# Patient Record
Sex: MALE | Race: WHITE | Employment: FULL TIME | URBAN - METROPOLITAN AREA
[De-identification: names, ages, dates, MRNs, and addresses within clinical notes are randomized per-mention and may not be internally consistent; named-entity substitution may affect disease eponyms.]

---

## 2017-06-30 ENCOUNTER — HOSPITAL ENCOUNTER (EMERGENCY)
Age: 37
Discharge: HOME | End: 2017-07-01
Attending: EMERGENCY MEDICINE
Payer: SELF-PAY

## 2017-06-30 DIAGNOSIS — T40.1X1A HEROIN OVERDOSE, ACCIDENTAL OR UNINTENTIONAL, INITIAL ENCOUNTER (HCC): Primary | ICD-10-CM

## 2017-06-30 DIAGNOSIS — Z72.0 TOBACCO ABUSE: ICD-10-CM

## 2017-06-30 LAB
ANION GAP BLD CALC-SCNC: 19 MMOL/L (ref 3–18)
APAP SERPL-MCNC: <2 UG/ML (ref 10–30)
ARTERIAL PATENCY WRIST A: ABNORMAL
BASE DEFICIT BLD-SCNC: 12 MMOL/L
BASOPHILS # BLD AUTO: 0 K/UL (ref 0–0.06)
BASOPHILS # BLD: 0 % (ref 0–2)
BDY SITE: ABNORMAL
BODY TEMPERATURE: 98.5
BUN SERPL-MCNC: 14 MG/DL (ref 7–18)
BUN/CREAT SERPL: 7 (ref 12–20)
CALCIUM SERPL-MCNC: 9.3 MG/DL (ref 8.5–10.1)
CHLORIDE SERPL-SCNC: 103 MMOL/L (ref 100–108)
CK MB CFR SERPL CALC: NORMAL % (ref 0–4)
CK MB SERPL-MCNC: <1 NG/ML (ref 5–25)
CK SERPL-CCNC: 136 U/L (ref 39–308)
CO2 SERPL-SCNC: 20 MMOL/L (ref 21–32)
CREAT SERPL-MCNC: 1.92 MG/DL (ref 0.6–1.3)
DIFFERENTIAL METHOD BLD: ABNORMAL
EOSINOPHIL # BLD: 0 K/UL (ref 0–0.4)
EOSINOPHIL NFR BLD: 0 % (ref 0–5)
ERYTHROCYTE [DISTWIDTH] IN BLOOD BY AUTOMATED COUNT: 12.7 % (ref 11.6–14.5)
ETHANOL SERPL-MCNC: <3 MG/DL (ref 0–3)
GAS FLOW.O2 O2 DELIVERY SYS: ABNORMAL L/MIN
GAS FLOW.O2 SETTING OXYMISER: 15 L/M
GLUCOSE SERPL-MCNC: 310 MG/DL (ref 74–99)
HCO3 BLD-SCNC: 16.8 MMOL/L (ref 22–26)
HCT VFR BLD AUTO: 44.1 % (ref 36–48)
HGB BLD-MCNC: 14.6 G/DL (ref 13–16)
LYMPHOCYTES # BLD AUTO: 29 % (ref 21–52)
LYMPHOCYTES # BLD: 4.7 K/UL (ref 0.9–3.6)
MAGNESIUM SERPL-MCNC: 2.5 MG/DL (ref 1.6–2.6)
MCH RBC QN AUTO: 31.9 PG (ref 24–34)
MCHC RBC AUTO-ENTMCNC: 33.1 G/DL (ref 31–37)
MCV RBC AUTO: 96.3 FL (ref 74–97)
MONOCYTES # BLD: 0.8 K/UL (ref 0.05–1.2)
MONOCYTES NFR BLD AUTO: 5 % (ref 3–10)
NEUTS SEG # BLD: 10.6 K/UL (ref 1.8–8)
NEUTS SEG NFR BLD AUTO: 66 % (ref 40–73)
O2/TOTAL GAS SETTING VFR VENT: 1 %
PCO2 BLD: 44.8 MMHG (ref 35–45)
PH BLD: 7.18 [PH] (ref 7.35–7.45)
PLATELET # BLD AUTO: 436 K/UL (ref 135–420)
PMV BLD AUTO: 9.9 FL (ref 9.2–11.8)
PO2 BLD: 298 MMHG (ref 80–100)
POTASSIUM SERPL-SCNC: 3.9 MMOL/L (ref 3.5–5.5)
RBC # BLD AUTO: 4.58 M/UL (ref 4.7–5.5)
SALICYLATES SERPL-MCNC: 4.1 MG/DL (ref 2.8–20)
SAO2 % BLD: 100 % (ref 92–97)
SERVICE CMNT-IMP: ABNORMAL
SODIUM SERPL-SCNC: 142 MMOL/L (ref 136–145)
SPECIMEN TYPE: ABNORMAL
TOTAL RESP. RATE, ITRR: 20
TROPONIN I SERPL-MCNC: <0.02 NG/ML (ref 0–0.06)
WBC # BLD AUTO: 16.1 K/UL (ref 4.6–13.2)

## 2017-06-30 PROCEDURE — 83735 ASSAY OF MAGNESIUM: CPT | Performed by: EMERGENCY MEDICINE

## 2017-06-30 PROCEDURE — 80307 DRUG TEST PRSMV CHEM ANLYZR: CPT | Performed by: EMERGENCY MEDICINE

## 2017-06-30 PROCEDURE — 74011250636 HC RX REV CODE- 250/636: Performed by: EMERGENCY MEDICINE

## 2017-06-30 PROCEDURE — 82803 BLOOD GASES ANY COMBINATION: CPT

## 2017-06-30 PROCEDURE — 85025 COMPLETE CBC W/AUTO DIFF WBC: CPT | Performed by: EMERGENCY MEDICINE

## 2017-06-30 PROCEDURE — 99285 EMERGENCY DEPT VISIT HI MDM: CPT

## 2017-06-30 PROCEDURE — 82550 ASSAY OF CK (CPK): CPT | Performed by: EMERGENCY MEDICINE

## 2017-06-30 PROCEDURE — 96374 THER/PROPH/DIAG INJ IV PUSH: CPT

## 2017-06-30 PROCEDURE — 80048 BASIC METABOLIC PNL TOTAL CA: CPT | Performed by: EMERGENCY MEDICINE

## 2017-06-30 PROCEDURE — 96361 HYDRATE IV INFUSION ADD-ON: CPT

## 2017-06-30 PROCEDURE — 36600 WITHDRAWAL OF ARTERIAL BLOOD: CPT

## 2017-06-30 RX ORDER — NALOXONE HYDROCHLORIDE 0.4 MG/ML
2 INJECTION, SOLUTION INTRAMUSCULAR; INTRAVENOUS; SUBCUTANEOUS
Status: COMPLETED | OUTPATIENT
Start: 2017-06-30 | End: 2017-06-30

## 2017-06-30 RX ORDER — SODIUM CHLORIDE 0.9 % (FLUSH) 0.9 %
5-10 SYRINGE (ML) INJECTION EVERY 8 HOURS
Status: DISCONTINUED | OUTPATIENT
Start: 2017-06-30 | End: 2017-07-01 | Stop reason: HOSPADM

## 2017-06-30 RX ORDER — SODIUM CHLORIDE 0.9 % (FLUSH) 0.9 %
5-10 SYRINGE (ML) INJECTION AS NEEDED
Status: DISCONTINUED | OUTPATIENT
Start: 2017-06-30 | End: 2017-07-01 | Stop reason: HOSPADM

## 2017-06-30 RX ORDER — NALOXONE HYDROCHLORIDE 1 MG/ML
4 INJECTION INTRAMUSCULAR; INTRAVENOUS; SUBCUTANEOUS
Status: COMPLETED | OUTPATIENT
Start: 2017-06-30 | End: 2017-06-30

## 2017-06-30 RX ADMIN — NALOXONE HYDROCHLORIDE 4 MG: 1 INJECTION PARENTERAL at 22:44

## 2017-06-30 RX ADMIN — NALOXONE HYDROCHLORIDE 2 MG: 0.4 INJECTION, SOLUTION INTRAMUSCULAR; INTRAVENOUS; SUBCUTANEOUS at 22:40

## 2017-06-30 RX ADMIN — SODIUM CHLORIDE 1000 ML: 900 INJECTION, SOLUTION INTRAVENOUS at 23:06

## 2017-07-01 VITALS
BODY MASS INDEX: 31.1 KG/M2 | HEIGHT: 69 IN | TEMPERATURE: 98.5 F | OXYGEN SATURATION: 100 % | HEART RATE: 86 BPM | SYSTOLIC BLOOD PRESSURE: 136 MMHG | DIASTOLIC BLOOD PRESSURE: 71 MMHG | RESPIRATION RATE: 16 BRPM | WEIGHT: 210 LBS

## 2017-07-01 NOTE — ED NOTES
People who brought pt to ED are no longer in parking lot nor lobby. ED registration states that male and female left immediately after pt brought into ED.

## 2017-07-01 NOTE — ED PROVIDER NOTES
HPI Comments: 10:34 PM    Clark Scanlon is a 39 y.o. male with no pertinent PMHx, presenting via friends in car to the ED due to unresponsiveness x an unknown time. Pt's friends state that they are unsure if the pt took any illicit drugs or used any alcohol tonight. Pt was assessed by this physician in the front passenger seat of a friend's vehicle, unresponsive to voice or touch. Pt was given 2mg of narcan intranasal, while still in the vehicle and 4 mg IV when in the room. Pt became alert after initial exam and admits to cocaine and heroin use, which he injects to his genital area. PCP: None    Hx is limited due to the pt's condition. The history is provided by the patient. No  was used. Past Medical History:   Diagnosis Date    Knee problem        Past Surgical History:   Procedure Laterality Date    ABDOMEN SURGERY PROC UNLISTED      HX ORTHOPAEDIC  hand surgery    INTESTINE SURG PROCEDURE UNLISTED           History reviewed. No pertinent family history. Social History     Social History    Marital status:      Spouse name: N/A    Number of children: N/A    Years of education: N/A     Occupational History    Not on file. Social History Main Topics    Smoking status: Current Every Day Smoker     Packs/day: 0.50     Years: 15.00    Smokeless tobacco: Never Used    Alcohol use No    Drug use: Yes     Special: Heroin, Cocaine    Sexual activity: Not on file     Other Topics Concern    Not on file     Social History Narrative         ALLERGIES: Review of patient's allergies indicates no known allergies.     Review of Systems   Unable to perform ROS: Patient unresponsive       Vitals:    06/30/17 2300 06/30/17 2306 06/30/17 2330 07/01/17 0100   BP: 122/77 122/77 122/63 136/71   Pulse: 100 100 (!) 104 86   Resp: 26 26 25 16   Temp:  98.5 °F (36.9 °C)     SpO2: 100% 100% 100% 100%   Weight:  95.3 kg (210 lb)     Height:  5' 9\" (1.753 m)              Physical Exam   Constitutional: He appears well-developed and well-nourished. Mottled skin   HENT:   Head: Normocephalic. Right Ear: External ear normal.   Left Ear: External ear normal.   Nose: Nose normal.   Mouth/Throat: Oropharynx is clear and moist.   Dry pasty secretions from mouth   Eyes:   Pupils are pinpoint   Cardiovascular: Normal rate, regular rhythm, normal heart sounds and intact distal pulses. Pulmonary/Chest:   No spontaneous respirations   Abdominal: Soft. Absent bowel sounds   Genitourinary:   Genitourinary Comments: Tourniquet found at the base of the penile ayush, with injection site noted along the dorsal vein   Musculoskeletal:   Faint pulse   Skin: He is diaphoretic. As above   Nursing note and vitals reviewed. RESULTS:    CARDIAC MONITOR NOTE:  Cardiac Rhythm: sinus tachycardia  Rate: 102 bpm     PULSE OXIMETRY NOTE:  Pulse-ox is 100% on RA  Interpretation: NML       No orders to display        Labs Reviewed   CBC WITH AUTOMATED DIFF - Abnormal; Notable for the following:        Result Value    WBC 16.1 (*)     RBC 4.58 (*)     PLATELET 258 (*)     ABS. NEUTROPHILS 10.6 (*)     ABS.  LYMPHOCYTES 4.7 (*)     All other components within normal limits   METABOLIC PANEL, BASIC - Abnormal; Notable for the following:     CO2 20 (*)     Anion gap 19 (*)     Glucose 310 (*)     Creatinine 1.92 (*)     BUN/Creatinine ratio 7 (*)     GFR est AA 48 (*)     GFR est non-AA 40 (*)     All other components within normal limits   ACETAMINOPHEN - Abnormal; Notable for the following:     Acetaminophen level <2 (*)     All other components within normal limits   POC G3 - Abnormal; Notable for the following:     pH (POC) 7.181 (*)     pO2 (POC) 298 (*)     HCO3 (POC) 16.8 (*)     sO2 (POC) 100 (*)     All other components within normal limits   SALICYLATE   MAGNESIUM   CARDIAC PANEL,(CK, CKMB & TROPONIN)   ETHYL ALCOHOL   DRUG SCREEN, URINE   BLOOD GAS, ARTERIAL       Recent Results (from the past 12 hour(s))   SALICYLATE    Collection Time: 06/30/17 10:50 PM   Result Value Ref Range    SALICYLATE 4.1 2.8 - 20 MG/DL   CBC WITH AUTOMATED DIFF    Collection Time: 06/30/17 10:50 PM   Result Value Ref Range    WBC 16.1 (H) 4.6 - 13.2 K/uL    RBC 4.58 (L) 4.70 - 5.50 M/uL    HGB 14.6 13.0 - 16.0 g/dL    HCT 44.1 36.0 - 48.0 %    MCV 96.3 74.0 - 97.0 FL    MCH 31.9 24.0 - 34.0 PG    MCHC 33.1 31.0 - 37.0 g/dL    RDW 12.7 11.6 - 14.5 %    PLATELET 328 (H) 140 - 420 K/uL    MPV 9.9 9.2 - 11.8 FL    NEUTROPHILS 66 40 - 73 %    LYMPHOCYTES 29 21 - 52 %    MONOCYTES 5 3 - 10 %    EOSINOPHILS 0 0 - 5 %    BASOPHILS 0 0 - 2 %    ABS. NEUTROPHILS 10.6 (H) 1.8 - 8.0 K/UL    ABS. LYMPHOCYTES 4.7 (H) 0.9 - 3.6 K/UL    ABS. MONOCYTES 0.8 0.05 - 1.2 K/UL    ABS. EOSINOPHILS 0.0 0.0 - 0.4 K/UL    ABS.  BASOPHILS 0.0 0.0 - 0.06 K/UL    DF AUTOMATED     METABOLIC PANEL, BASIC    Collection Time: 06/30/17 10:50 PM   Result Value Ref Range    Sodium 142 136 - 145 mmol/L    Potassium 3.9 3.5 - 5.5 mmol/L    Chloride 103 100 - 108 mmol/L    CO2 20 (L) 21 - 32 mmol/L    Anion gap 19 (H) 3.0 - 18 mmol/L    Glucose 310 (H) 74 - 99 mg/dL    BUN 14 7.0 - 18 MG/DL    Creatinine 1.92 (H) 0.6 - 1.3 MG/DL    BUN/Creatinine ratio 7 (L) 12 - 20      GFR est AA 48 (L) >60 ml/min/1.73m2    GFR est non-AA 40 (L) >60 ml/min/1.73m2    Calcium 9.3 8.5 - 10.1 MG/DL   MAGNESIUM    Collection Time: 06/30/17 10:50 PM   Result Value Ref Range    Magnesium 2.5 1.6 - 2.6 mg/dL   CARDIAC PANEL,(CK, CKMB & TROPONIN)    Collection Time: 06/30/17 10:50 PM   Result Value Ref Range     39 - 308 U/L    CK - MB <1.0 <3.6 ng/ml    CK-MB Index  0.0 - 4.0 %     CALCULATION NOT PERFORMED WHEN RESULT IS BELOW LINEAR LIMIT    Troponin-I, Qt. <0.02 0.00 - 0.06 NG/ML   ACETAMINOPHEN    Collection Time: 06/30/17 10:50 PM   Result Value Ref Range    Acetaminophen level <2 (L) 10 - 30 ug/mL   ETHYL ALCOHOL    Collection Time: 06/30/17 10:50 PM   Result Value Ref Range ALCOHOL(ETHYL),SERUM <3 0 - 3 MG/DL   POC G3    Collection Time: 06/30/17 10:52 PM   Result Value Ref Range    Device: Non rebreather      Flow rate (POC) 15 L/M    FIO2 (POC) 1.0 %    pH (POC) 7.181 (LL) 7.35 - 7.45      pCO2 (POC) 44.8 35.0 - 45.0 MMHG    pO2 (POC) 298 (H) 80 - 100 MMHG    HCO3 (POC) 16.8 (L) 22 - 26 MMOL/L    sO2 (POC) 100 (H) 92 - 97 %    Base deficit (POC) 12 mmol/L    Allens test (POC) N/A      Total resp. rate 20      Site RIGHT RADIAL      Patient temp. 98.5      Specimen type (POC) ARTERIAL      Performed by Ashutosh López          MDM  Number of Diagnoses or Management Options  Heroin overdose, accidental or unintentional, initial encounter:   Tobacco abuse:   Diagnosis management comments: Respiratory arrest with pin pupils, immediately tx for suspected narcotic or heroin overdose. Other possibilities include aspiration. Possible, but unlikely, cardiac event or other pulmonary foreign body. After intranasal narcan delivered in the parking lot, quickly taken to ED bed 3. Given pulmonary support (BVM) and narcan 4 mg IV, with near resolution of his respiratory distress. Further workup for congestion, lab work, blood gases and possibility of cardiac injury or neurologic ischemia/anoxic brain injury.        Amount and/or Complexity of Data Reviewed  Clinical lab tests: ordered and reviewed  Review and summarize past medical records: yes    Critical Care  Total time providing critical care: 30-74 minutes    ED Course     Medications   sodium chloride (NS) flush 5-10 mL (not administered)   sodium chloride (NS) flush 5-10 mL (not administered)   sodium chloride (NS) flush 5-10 mL (not administered)   sodium chloride (NS) flush 5-10 mL (not administered)   naloxone (NARCAN) injection 2 mg (2 mg IntraNASal Given 6/30/17 2240)   naloxone (NARCAN) injection 4 mg (4 mg IntraVENous Given 6/30/17 2244)   sodium chloride 0.9 % bolus infusion 1,000 mL (0 mL IntraVENous IV Completed 7/1/17 0100) Procedures    PROGRESS NOTE:  10:34 PM  Initial assessment performed. Written by Geovanni Hudson, MIGUEL Scribe, as dictated by Hannah Daugherty MD.    PROGRESS NOTE:  11:00 PM  Pt re-evaluated. Pt is now more alert, responsive to questions, pupils are now 5mm with normal spontaneous respirations. He becomes somnolent again without persistent stimulation, but patent airway. Written by MIGUEL Brannone, as dictated by Hannah Daugherty MD.     PROGRESS NOTE:  1:58 AM  Pt and/or family have been updated on their results. Pt and/or pt's family are aware of the plan of care and are in agreement. Written by MIGUEL Brannon, as dictated by Hannah Daugherty MD.     DISCHARGE NOTE:  2:36 AM  The patient is ready for discharge. The patient's signs, symptoms, diagnosis, and discharge instructions have been discussed and the patient and/or family has conveyed their understanding. The patient and/or family is to follow up as recommended or return to the ER should their symptoms worsen. Plan has been discussed and the patient and/or family is in agreement. Written by MIGUEL Brannon, as dictated by Hannah Daugherty MD.      CLINICAL IMPRESSION:  1. Heroin overdose, accidental or unintentional, initial encounter    2. Tobacco abuse        PLAN:  1. D/C Home    2. There are no discharge medications for this patient. 3.   Follow-up Information     Follow up With Details Comments [address] ([facility]) Schedule an appointment as soon as possible for a visit for behavioral health follow up [address] [place] [address] EMERGENCY DEPT  As needed, If symptoms worsen [address] [facility]  [phone]          SCRIBE ATTESTATION:  This note is prepared by Geovanni Hudson, acting as Scribe for [employer]. Roberto Daugherty MD.    PROVIDER ATTESTATION:  [employer].  Roberto Daugherty MD: The scribe's documentation has been prepared under my direction and personally reviewed by me in its entirety. I confirm that the note above accurately reflects all work, treatment, procedures, and medical decision making performed by me. CRITICAL CARE NOTE:  6:16 AM  I have spent 30 minutes of critical care time involved in lab review, consultations with specialist, family decision-making, and documentation. During this entire length of time I was immediately available to the patient. Critical Care: The reason for providing this level of medical care for this critically ill patient was due a critical illness that impaired one or more vital organ systems such that there was a high probability of imminent or life threatening deterioration in the patients condition. This care involved high complexity decision making to assess, manipulate, and support vital system functions, to treat this degreee vital organ system failure and to prevent further life threatening deterioration of the patients condition.

## 2017-07-01 NOTE — ED NOTES
Pt alert, asking \"where are my pants? \", informed patient that he was brought to ED with the shorts he had on at this time, the t shirt we cut and shoes. Pt requested phone to call his brother to bring him clothes from home. Pt states he had been doing drugs all evening after getting off from work. Cup of water provided for patient.

## 2017-07-01 NOTE — DISCHARGE INSTRUCTIONS
Opioid Overdose: Care Instructions  Your Care Instructions    You have had treatment to help your body recover from taking too much of an opioid. You are getting better, but you may not feel well for a while. It takes time for the opioids to leave your body. How long it takes to feel better depends on which drug you took and how much you took of it. Opioids include illegal drugs such as heroin, often called smack, junk, H, and ska. Opioids also include medicines that doctors prescribe to treat pain. These are medicines such as oxycodone, methadone, and buprenorphine. They are sometimes sold and used illegally. Taking too much of an opioid can be dangerous. It may cause:  · Trouble breathing. · Low blood pressure. · A low heart rate. · A coma. When the doctor treated you for the overdose, he or she may have:  · Watched your symptoms or done tests to find out what kind of drug you took. · Given you fluids. · Given you oxygen to help you breathe. · Given you a medicine called naloxone to help reverse the effects of the opioid. · Done several tests, including blood tests, to see how you're responding to treatment. The doctor also watched you carefully to make sure you were recovering safely. Follow-up care is a key part of your treatment and safety. Be sure to make and go to all appointments, and call your doctor if you are having problems. It's also a good idea to know your test results and keep a list of the medicines you take. How can you care for yourself at home? · If you take opioids regularly, your body gets used to them. This is called dependency. If you are dependent on this drug, you may have withdrawal symptoms when you stop taking it. These can include nausea, sweating, chills, diarrhea, stomach cramps, and muscle aches. Withdrawal can last up to several weeks, depending on which drug you took. You may feel very ill, but you are probably not in medical danger.   · Your doctor may give you medicine to help you feel better. To help get through withdrawal, you can also:  ¨ Get plenty of rest.  ¨ Drink plenty of fluids. ¨ Stay active, but don't tire yourself. ¨ Eat a healthy diet. · If you had a tube in your throat to help you breathe, you may have a sore throat or hoarseness that can last a few days. Sip liquids to help soothe your throat. · Do not drink alcohol or take illegal drugs. · Do not drive if you feel sleepy or groggy while you recover from your overdose. · Get help to stop using drugs. Talk to your doctor about drug treatment programs. · Talk to your doctor or pharmacist about having a naloxone rescue kit on hand. When should you call for help? Call 911 anytime you think you may need emergency care. For example, call if:  · You feel you cannot stop from hurting yourself or someone else. Call your doctor now or seek immediate medical care if:  · You have new or worse withdrawal symptoms, such as:  ¨ Stomach cramps. ¨ Vomiting. ¨ Diarrhea. ¨ Muscle aches. ¨ Sweating. Watch closely for changes in your health, and be sure to contact your doctor if:  · You do not get better as expected. Where can you learn more? Go to http://edi-desean.info/. Enter 737 96 181 in the search box to learn more about \"Opioid Overdose: Care Instructions. \"  Current as of: February 21, 2017  Content Version: 11.3  © 9258-7623 [employer]. Care instructions adapted under license by [employer] (which disclaims liability or warranty for this information). If you have questions about a medical condition or this instruction, always ask your healthcare professional. [place] [address] any warranty or liability for your use of this information.

## 2017-07-01 NOTE — ED NOTES
I have reviewed discharge instructions with the patient. The patient verbalized understanding. Patient armband removed and shredded  Reviewed and verified discharge instructions with patient, no prescriptions given, pt discharged ambulatory on steady gait with family.

## 2017-07-01 NOTE — ED NOTES
Pt admits to [facility] officer that he shot ou heroin and cocaine, denies knowing where or who he was with with this occurred. Later, pt states that it occurred in [place].

## 2017-07-01 NOTE — ED TRIAGE NOTES
Called to front to help get \"unresponsive\" patient out of car. Found small SUV parked at curb with male and female standing outside of vehicle stating that they had \"picked up\" their friend to give him a ride home. Stated that pt \"did not sound right\" when they went to pick him up and found him lying on the ground at location where he was picked up. Brought him to ED because \"we didn't want to see him die\". Both report that they do not know male in car well and do not know if he is a drug user. Pt found slumped over in passenger seat of vehicle with blue face and agonal respirations, carotid pulse strong. Pt lied back, airway repositioned, and another RN went back into ED to obtain stretcher and Narcan. MD and ED medic to driveway to assist.  Pt given 2mg IN Narcan without response and removed from vehicle and placed onto stretcher by this RN and Dr Aide Maldonado, brought immediately to ED room #3. Sepsis Screening completed    (  )Patient meets SIRS criteria. (X  )Patient does not meet SIRS criteria.       SIRS Criteria is achieved when two or more of the following are present   Temperature < 96.8°F (36°C) or > 100.9°F (38.3°C)   Heart Rate > 90 beats per minute   Respiratory Rate > 20 breaths per minute   WBC count > 12,000 or <4,000 or > 10% bands

## 2017-07-01 NOTE — ED NOTES
After Narcan given, pt became diaphoretic and skin became pink again, pt has multiple old puncture [facility]. Pt with shorts on and hole in the front of the crotch area, shorts lowered and found home made tourniquet around the scrotum. Attempted to remove tourniquet and pt immediately sat up and grabbed at scrotum, pt awake enough to remove tourniquet unassisted.

## 2019-06-10 ENCOUNTER — HOSPITAL ENCOUNTER (EMERGENCY)
Age: 39
Discharge: HOME | End: 2019-06-10
Attending: EMERGENCY MEDICINE
Payer: SELF-PAY

## 2019-06-10 VITALS
DIASTOLIC BLOOD PRESSURE: 74 MMHG | WEIGHT: 197 LBS | HEIGHT: 69 IN | RESPIRATION RATE: 20 BRPM | SYSTOLIC BLOOD PRESSURE: 117 MMHG | TEMPERATURE: 98.4 F | HEART RATE: 73 BPM | BODY MASS INDEX: 29.18 KG/M2 | OXYGEN SATURATION: 100 %

## 2019-06-10 DIAGNOSIS — L02.419 ABSCESS OF ARM: Primary | ICD-10-CM

## 2019-06-10 PROCEDURE — 99282 EMERGENCY DEPT VISIT SF MDM: CPT

## 2019-06-10 RX ORDER — DEXTROAMPHETAMINE SACCHARATE, AMPHETAMINE ASPARTATE, DEXTROAMPHETAMINE SULFATE AND AMPHETAMINE SULFATE 2.5; 2.5; 2.5; 2.5 MG/1; MG/1; MG/1; MG/1
10 TABLET ORAL
COMMUNITY

## 2019-06-10 RX ORDER — CEPHALEXIN 500 MG/1
500 CAPSULE ORAL 4 TIMES DAILY
Qty: 28 CAP | Refills: 0 | Status: SHIPPED | OUTPATIENT
Start: 2019-06-10 | End: 2019-06-17

## 2019-06-10 RX ORDER — SULFAMETHOXAZOLE AND TRIMETHOPRIM 800; 160 MG/1; MG/1
1 TABLET ORAL 2 TIMES DAILY
Qty: 20 TAB | Refills: 0 | Status: SHIPPED | OUTPATIENT
Start: 2019-06-10 | End: 2019-06-20

## 2019-06-10 NOTE — DISCHARGE INSTRUCTIONS

## 2019-06-10 NOTE — ED PROVIDER NOTES
EMERGENCY DEPARTMENT HISTORY AND PHYSICAL EXAM    Date: 6/10/2019  Patient Name: Carson Adames    History of Presenting Illness     Chief Complaint   Patient presents with    Skin Problem         History Provided By: Patient    12:17 PM  Carson Adames is a 45 y.o. male presents to the emergency department C/O right forearm pain onset this morning. Associated sxs include redness and swelling. Patient states he had some itching to the right forearm last night, woke up this morning with pain redness and swelling to the right forearm. Unsure of any insect bite. Pt denies fever, injury, history of abscesses, new tattoo, IV drug use and any other sxs or complaints. PCP: None    Current Outpatient Medications   Medication Sig Dispense Refill    dextroamphetamine-amphetamine (ADDERALL) 10 mg tablet Take 10 mg by mouth.  cephALEXin (KEFLEX) 500 mg capsule Take 1 Cap by mouth four (4) times daily for 7 days. 28 Cap 0    trimethoprim-sulfamethoxazole (BACTRIM DS) 160-800 mg per tablet Take 1 Tab by mouth two (2) times a day for 10 days. 20 Tab 0       Past History     Past Medical History:  Past Medical History:   Diagnosis Date    Knee problem        Past Surgical History:  Past Surgical History:   Procedure Laterality Date    ABDOMEN SURGERY PROC UNLISTED      HX ORTHOPAEDIC  hand surgery    INTESTINE SURG PROCEDURE UNLISTED         Family History:  History reviewed. No pertinent family history. Social History:  Social History     Tobacco Use    Smoking status: Current Every Day Smoker     Packs/day: 0.50     Years: 15.00     Pack years: 7.50    Smokeless tobacco: Never Used   Substance Use Topics    Alcohol use: No    Drug use: Yes     Types: Heroin, Cocaine       Allergies:  No Known Allergies      Review of Systems   Review of Systems   Constitutional: Negative for fever. Skin: Positive for rash (right forearm). All other systems reviewed and are negative.         Physical Exam     Vitals: 06/10/19 1127   BP: 117/74   Pulse: 73   Resp: 20   Temp: 98.4 °F (36.9 °C)   SpO2: 100%   Weight: 89.4 kg (197 lb)   Height: 5' 9\" (1.753 m)     Physical Exam    Vital signs and nursing notes reviewed. CONSTITUTIONAL: Alert. Well-appearing; well-nourished; in no apparent distress. EXT: Normal ROM in all four extremities. Right upper extremity: Approximately 3 cm round area of raised mildly tender erythema to the ulnar aspect of his right mid forearm; no fluctuance or streaking or open wounds; old tattoos on arm. Full range of motion and nontender elbow and wrist.  Distal sensation intact. 2+ radial pulse  NEURO: A & O x3. PSYCH:  Mood and affect appropriate. Diagnostic Study Results     Labs -   No results found for this or any previous visit (from the past 12 hour(s)). Radiologic Studies -   No orders to display     CT Results  (Last 48 hours)    None        CXR Results  (Last 48 hours)    None          Medications given in the ED-  Medications - No data to display      Medical Decision Making   I am the first provider for this patient. I reviewed the vital signs, available nursing notes, past medical history, past surgical history, family history and social history. Records Reviewed: Nursing Notes      Procedures:  Procedures    ED Course:  12:17 PM   Initial assessment performed. The patients presenting problems have been discussed, and they are in agreement with the care plan formulated and outlined with them. I have encouraged them to ask questions as they arise throughout their visit. Provider Notes (Medical Decision Making): Zane Smalls is a 45 y.o. male with new early abscess to right forearm. He is afebrile, without any fluctuance, skin breakdown or evidence of joint involvement. No history of IV drug use and visible evidence of this. Abscess is not ready for I&D. Will place empirically on antibiotics, recommend warm compresses and 48-hour wound recheck.   Advised to return sooner if symptoms worsen. Diagnosis and Disposition       DISCHARGE NOTE:    Garcia Allen's  results have been reviewed with him. He has been counseled regarding his diagnosis, treatment, and plan. He verbally conveys understanding and agreement of the signs, symptoms, diagnosis, treatment and prognosis and additionally agrees to follow up as discussed. He also agrees with the care-plan and conveys that all of his questions have been answered. I have also provided discharge instructions for him that include: educational information regarding their diagnosis and treatment, and list of reasons why they would want to return to the ED prior to their follow-up appointment, should his condition change. He has been provided with education for proper emergency department utilization. CLINICAL IMPRESSION:    1. Abscess of arm        PLAN:  1. D/C Home  2. Discharge Medication List as of 6/10/2019 12:15 PM      START taking these medications    Details   cephALEXin (KEFLEX) 500 mg capsule Take 1 Cap by mouth four (4) times daily for 7 days. , Normal, Disp-28 Cap, R-0      trimethoprim-sulfamethoxazole (BACTRIM DS) 160-800 mg per tablet Take 1 Tab by mouth two (2) times a day for 10 days. , Normal, Disp-20 Tab, R-0         CONTINUE these medications which have NOT CHANGED    Details   dextroamphetamine-amphetamine (ADDERALL) 10 mg tablet Take 10 mg by mouth., Historical Med           3. Follow-up Information     Follow up With Specialties Details Why [address]    [facility] Medicine In 2 days For wound re-check, If symptoms worsen [address] [address]  [phone]        _______________________________      Please note that this dictation was completed with [employer], the computer voice recognition software. Quite often unanticipated grammatical, syntax, homophones, and other interpretive errors are inadvertently transcribed by the computer software.   Please disregard these errors. Please excuse any errors that have escaped final proofreading.

## 2020-01-15 ENCOUNTER — HOSPITAL ENCOUNTER (EMERGENCY)
Age: 40
Discharge: HOME | End: 2020-01-15
Attending: EMERGENCY MEDICINE
Payer: SELF-PAY

## 2020-01-15 VITALS
DIASTOLIC BLOOD PRESSURE: 74 MMHG | OXYGEN SATURATION: 100 % | HEIGHT: 69 IN | SYSTOLIC BLOOD PRESSURE: 120 MMHG | WEIGHT: 210 LBS | RESPIRATION RATE: 26 BRPM | BODY MASS INDEX: 31.1 KG/M2 | HEART RATE: 72 BPM | TEMPERATURE: 97.5 F

## 2020-01-15 DIAGNOSIS — F19.10 POLYSUBSTANCE ABUSE (HCC): ICD-10-CM

## 2020-01-15 DIAGNOSIS — T50.901A DRUG OVERDOSE, ACCIDENTAL OR UNINTENTIONAL, INITIAL ENCOUNTER: Primary | ICD-10-CM

## 2020-01-15 LAB
ALBUMIN SERPL-MCNC: 5 G/DL (ref 3.4–5)
ALBUMIN/GLOB SERPL: 1.2 {RATIO} (ref 0.8–1.7)
ALP SERPL-CCNC: 63 U/L (ref 45–117)
ALT SERPL-CCNC: 108 U/L (ref 16–61)
AMPHET UR QL SCN: POSITIVE
ANION GAP SERPL CALC-SCNC: 11 MMOL/L (ref 3–18)
APAP SERPL-MCNC: <2 UG/ML (ref 10–30)
APPEARANCE UR: CLEAR
AST SERPL-CCNC: 59 U/L (ref 10–38)
ATRIAL RATE: 77 BPM
BACTERIA URNS QL MICRO: ABNORMAL /HPF
BARBITURATES UR QL SCN: NEGATIVE
BASOPHILS # BLD: 0 K/UL (ref 0–0.1)
BASOPHILS NFR BLD: 0 % (ref 0–2)
BENZODIAZ UR QL: NEGATIVE
BILIRUB SERPL-MCNC: 0.5 MG/DL (ref 0.2–1)
BILIRUB UR QL: NEGATIVE
BUN SERPL-MCNC: 15 MG/DL (ref 7–18)
BUN/CREAT SERPL: 11 (ref 12–20)
CALCIUM SERPL-MCNC: 9.2 MG/DL (ref 8.5–10.1)
CALCULATED P AXIS, ECG09: 71 DEGREES
CALCULATED R AXIS, ECG10: 74 DEGREES
CALCULATED T AXIS, ECG11: 68 DEGREES
CANNABINOIDS UR QL SCN: NEGATIVE
CHLORIDE SERPL-SCNC: 102 MMOL/L (ref 100–111)
CO2 SERPL-SCNC: 27 MMOL/L (ref 21–32)
COCAINE UR QL SCN: POSITIVE
COLOR UR: YELLOW
CREAT SERPL-MCNC: 1.36 MG/DL (ref 0.6–1.3)
DIAGNOSIS, 93000: NORMAL
DIFFERENTIAL METHOD BLD: NORMAL
EOSINOPHIL # BLD: 0 K/UL (ref 0–0.4)
EOSINOPHIL NFR BLD: 0 % (ref 0–5)
EPITH CASTS URNS QL MICRO: ABNORMAL /LPF (ref 0–5)
ERYTHROCYTE [DISTWIDTH] IN BLOOD BY AUTOMATED COUNT: 12.2 % (ref 11.6–14.5)
ETHANOL SERPL-MCNC: <3 MG/DL (ref 0–3)
GLOBULIN SER CALC-MCNC: 4.2 G/DL (ref 2–4)
GLUCOSE SERPL-MCNC: 238 MG/DL (ref 74–99)
GLUCOSE UR STRIP.AUTO-MCNC: 250 MG/DL
HCT VFR BLD AUTO: 47.2 % (ref 36–48)
HDSCOM,HDSCOM: ABNORMAL
HGB BLD-MCNC: 15.2 G/DL (ref 13–16)
HGB UR QL STRIP: NEGATIVE
HYALINE CASTS URNS QL MICRO: ABNORMAL /LPF (ref 0–2)
KETONES UR QL STRIP.AUTO: NEGATIVE MG/DL
LEUKOCYTE ESTERASE UR QL STRIP.AUTO: NEGATIVE
LYMPHOCYTES # BLD: 2.4 K/UL (ref 0.9–3.6)
LYMPHOCYTES NFR BLD: 26 % (ref 21–52)
MAGNESIUM SERPL-MCNC: 2.5 MG/DL (ref 1.6–2.6)
MCH RBC QN AUTO: 30.5 PG (ref 24–34)
MCHC RBC AUTO-ENTMCNC: 32.2 G/DL (ref 31–37)
MCV RBC AUTO: 94.6 FL (ref 74–97)
METHADONE UR QL: NEGATIVE
MONOCYTES # BLD: 0.5 K/UL (ref 0.05–1.2)
MONOCYTES NFR BLD: 5 % (ref 3–10)
MUCOUS THREADS URNS QL MICRO: ABNORMAL /LPF
NEUTS SEG # BLD: 6.3 K/UL (ref 1.8–8)
NEUTS SEG NFR BLD: 69 % (ref 40–73)
NITRITE UR QL STRIP.AUTO: NEGATIVE
OPIATES UR QL: POSITIVE
P-R INTERVAL, ECG05: 152 MS
PCP UR QL: NEGATIVE
PH UR STRIP: 5.5 [PH] (ref 5–8)
PLATELET # BLD AUTO: 281 K/UL (ref 135–420)
PMV BLD AUTO: 9.9 FL (ref 9.2–11.8)
POTASSIUM SERPL-SCNC: 3.9 MMOL/L (ref 3.5–5.5)
PROT SERPL-MCNC: 9.2 G/DL (ref 6.4–8.2)
PROT UR STRIP-MCNC: 100 MG/DL
Q-T INTERVAL, ECG07: 424 MS
QRS DURATION, ECG06: 88 MS
QTC CALCULATION (BEZET), ECG08: 479 MS
RBC # BLD AUTO: 4.99 M/UL (ref 4.7–5.5)
RBC #/AREA URNS HPF: ABNORMAL /HPF (ref 0–5)
SALICYLATES SERPL-MCNC: 2 MG/DL (ref 2.8–20)
SODIUM SERPL-SCNC: 140 MMOL/L (ref 136–145)
SP GR UR REFRACTOMETRY: 1.02 (ref 1–1.03)
UROBILINOGEN UR QL STRIP.AUTO: 1 EU/DL (ref 0.2–1)
VENTRICULAR RATE, ECG03: 77 BPM
WBC # BLD AUTO: 9.2 K/UL (ref 4.6–13.2)
WBC URNS QL MICRO: ABNORMAL /HPF (ref 0–5)

## 2020-01-15 PROCEDURE — 80053 COMPREHEN METABOLIC PANEL: CPT

## 2020-01-15 PROCEDURE — 80307 DRUG TEST PRSMV CHEM ANLYZR: CPT

## 2020-01-15 PROCEDURE — 85025 COMPLETE CBC W/AUTO DIFF WBC: CPT

## 2020-01-15 PROCEDURE — 83735 ASSAY OF MAGNESIUM: CPT

## 2020-01-15 PROCEDURE — 93005 ELECTROCARDIOGRAM TRACING: CPT

## 2020-01-15 PROCEDURE — 81001 URINALYSIS AUTO W/SCOPE: CPT

## 2020-01-15 PROCEDURE — 99285 EMERGENCY DEPT VISIT HI MDM: CPT

## 2020-01-16 NOTE — ED NOTES
Verbal shift change report given to [address] Tenisha (oncoming nurse) by Cristobal Potter RN (offgoing nurse). Report included the following information SBAR and ED Summary.

## 2020-01-16 NOTE — DISCHARGE INSTRUCTIONS
Patient Education        Cocaine Use: Care Instructions  Overview    Using cocaine can cause physical and mental harm. It can increase your heart rate and blood pressure, which can lead to a heart attack and even death. It can raise your body temperature. You may have nausea, vomiting, and chills. If you smoke cocaine, the fumes can cause breathing problems. If you snort cocaine, it can damage your nasal passages. If you inject cocaine, it can cause an abscess at the injection site or an infection throughout your body. You may become shaky and restless. You also may see or hear things that are not there (hallucinations) or believe things that are not true (delusions). When the doctor treated you, he or she may have:  · Watched your symptoms or done tests to find out how much cocaine was in your body. · Treated you to control your heart rate, temperature, and blood pressure. · Given you oxygen to help you breathe. · Given you medicine to settle your thoughts and help keep you calm. The doctor has checked you carefully, but problems can develop later. If you notice any problems or new symptoms, get medical treatment right away. How can you care for yourself at home? · When you use cocaine regularly, your body and brain get used to it. This is called physical dependence. If you are physically dependent on cocaine, you may have withdrawal symptoms when you stop using it. You may feel drowsy, have vivid dreams, or feel hungry, tired, or depressed. You may also feel confused and have trouble thinking clearly. To help you get past these symptoms:  ? Get plenty of rest.  ? Drink lots of fluids. ? Stay active. ? Eat a healthy diet. · Talk to your doctor about substance use treatment programs that can help you stop using cocaine. When should you call for help? Call  911 anytime you think you may need emergency care. For example, call if:    · You have symptoms of a heart attack. These may include:  ?  Chest pain or pressure, or a strange feeling in the chest.  ? Sweating. ? Shortness of breath. ? Nausea or vomiting. ? Pain, pressure, or a strange feeling in the back, neck, jaw, or upper belly or in one or both shoulders or arms. ? A fast or irregular heartbeat. After you call 911, the  may tell you to chew 1 adult-strength or 2 to 4 low-dose aspirin. Wait for an ambulance. Do not try to drive yourself.     · You feel you cannot stop from hurting yourself or someone else.   [facility] your doctor now or seek immediate medical care if:    · You have severe side effects from using cocaine. These may include problems with thinking, such as seeing things that aren't there or thinking that someone is trying to harm you (paranoia).     · You have new or worse withdrawal symptoms.    Watch closely for changes in your health, and be sure to contact your doctor if:    · You need more help or support to stop. Where can you learn more? Go to http://edi-desean.info/. Enter Z602 in the search box to learn more about \"Cocaine Use: Care Instructions. \"  Current as of: February 5, 2019  Content Version: 12.2  © 0181-6395 [employer]. Care instructions adapted under license by [employer] (which disclaims liability or warranty for this information). If you have questions about a medical condition or this instruction, always ask your healthcare professional. [place] [address] any warranty or liability for your use of this information. Patient Education        Accidental Overdose of Medicine: Care Instructions  Your Care Instructions    Almost any medicine can cause harm if you take too much of it. You have had treatment to help your body get rid of an overdose of a medicine. This may have been an over-the-counter medicine. Or it might be one that a doctor prescribed. It may even have been a vitamin or a supplement.   During treatment, the doctor may have given you fluids and medicine. You also may have had lab tests. Then the doctor made sure that you were well enough to go home. The doctor has checked you carefully, but problems can develop later. If you notice any problems or new symptoms, get medical treatment right away. Follow-up care is a key part of your treatment and safety. Be sure to make and go to all appointments, and call your doctor if you are having problems. It's also a good idea to know your test results and keep a list of the medicines you take. How can you care for yourself at home? Home care  · Talk with your doctor about what you should eat or drink. · If you normally take medicines, ask your doctor when you can start taking them again. · Read the information that comes with any medicine. If you have questions, ask your doctor or pharmacist.  Prevention  · Be safe with medicines. Take your medicines exactly as prescribed or as the label directs. Call your doctor if you think you are having a problem with your medicine. · Keep your medicines in the containers they came in. Keep them with the original labels. · Find out what to do if you miss a dose of your medicine. When should you call for help? Poison control centers, hospitals, or your doctor can give immediate advice in the case of a poisoning. The [facility] [employer] number is [phone]. Have the poison container with you so you can give complete information to the poison control center. This includes what the poison or substance is, when it was taken, and how much was taken. Do not try to make the person vomit.   Call 911 anytime you think you may need emergency care.  For example, call if you or someone else:    · Has used or currently uses alcohol or drugs and is very confused or can't stay awake.     · Has passed out (lost consciousness).     · Has severe trouble breathing.     · Is having a seizure.    Call your doctor now or seek immediate medical care if:    · You are vomiting.     · You have a new or worse headache.     · You are dizzy or lightheaded or feel like you may faint.    Watch closely for changes in your health, and be sure to contact your doctor if:    · You do not get better as expected. Where can you learn more? Go to http://edi-desean.info/. Enter C165 in the search box to learn more about \"Accidental Overdose of Medicine: Care Instructions. \"  Current as of: December 13, 2018  Content Version: 12.2  © 4010-2930 [employer]. Care instructions adapted under license by [employer] (which disclaims liability or warranty for this information). If you have questions about a medical condition or this instruction, always ask your healthcare professional. [address] any warranty or liability for your use of this information.

## 2020-01-16 NOTE — ED TRIAGE NOTES
Patient brought in via EMS c/o overdose. Per EMS patient was on the kitchen floor upon arrival, Police had given 2mg Narcan and EMS 2 mg. Per EMS family stated patient did not have a pulse and had started CPR, patient came around per EMS after \"10 secs\". Patient denies taking any drugs. Per EMS via family patient was released from snf yesterday. Patient is alert and oriented and able to make needs known.

## 2020-01-16 NOTE — ED PROVIDER NOTES
EMERGENCY DEPARTMENT HISTORY AND PHYSICAL EXAM    Date: 1/15/2020  Patient Name: Yvan Hinds    History of Presenting Illness     Chief Complaint   Patient presents with    Drug Overdose         History Provided By: Patient    Additional History (Context):     8:37 PM    Yvan Hinds is a 44 y.o. male with no pertinent PMHx, presenting via EMS to the ED for drug overdose x just PTA in the ED. EMS was called for unresponsive pt at the nearby mobile home. At their arrival, PD had given 2mg of Narcan PTA and they were completing chest compressions. EMS could not find a pulse for the pt. They administered another 2mg of Narcan, and the pt became more responsive. Pt denies chest, HA, or vomiting. He states that he does not remember the event. He states that he has not drank alcohol the last few days, nor has he used any illicit drugs tonight. Per chart review, the pt has a h/o cocaine and heroin abuse. Pt was released from [facility] yesterday. Pt denies any history of HTN, DM, or cardiovascular disease. Pt states that he does smoke cigarettes. There are no other complaints, changes, or physical findings at this time. Current Outpatient Medications   Medication Sig Dispense Refill    dextroamphetamine-amphetamine (ADDERALL) 10 mg tablet Take 10 mg by mouth. Past History     Past Medical History:  Past Medical History:   Diagnosis Date    Knee problem        Past Surgical History:  Past Surgical History:   Procedure Laterality Date    ABDOMEN SURGERY PROC UNLISTED      HX ORTHOPAEDIC  hand surgery    [place] UNLISTED PROCEDURE SMALL INTESTINE         Family History:  History reviewed. No pertinent family history.     Social History:  Social History     Tobacco Use    Smoking status: Current Every Day Smoker     Packs/day: 0.50     Years: 15.00     Pack years: 7.50    Smokeless tobacco: Never Used   Substance Use Topics    Alcohol use: No    Drug use: Yes     Types: Heroin, Cocaine       Allergies:  No Known Allergies      Review of Systems   Review of Systems   Unable to perform ROS: Acuity of condition       Physical Exam     Vitals:    01/15/20 2100 01/15/20 2115 01/15/20 2230 01/15/20 2300   BP: 127/86 127/79 138/87 120/74   Pulse: 77 74 78 72   Resp: 15 13 17 26   Temp:       SpO2: 100% 100% 91% 100%   Weight:       Height:         Physical Exam  Vitals signs and nursing note reviewed. Constitutional:       General: He is not in acute distress. Appearance: He is well-developed. He is not diaphoretic. HENT:      Head: Normocephalic and atraumatic. Right Ear: External ear normal.      Left Ear: External ear normal.      Mouth/Throat:      Pharynx: No oropharyngeal exudate. Comments: Edentulous  Eyes:      General: No scleral icterus. Conjunctiva/sclera: Conjunctivae normal.      Pupils: Pupils are equal, round, and reactive to light. Comments: No pallor   Neck:      Musculoskeletal: Normal range of motion and neck supple. Thyroid: No thyromegaly. Vascular: No JVD. Trachea: No tracheal deviation. Cardiovascular:      Rate and Rhythm: Normal rate and regular rhythm. Heart sounds: Normal heart sounds. Pulmonary:      Effort: Pulmonary effort is normal. No respiratory distress. Breath sounds: Normal breath sounds. No stridor. Abdominal:      General: Bowel sounds are normal. There is no distension. Palpations: Abdomen is soft. Tenderness: There is no tenderness. There is no guarding or rebound. Musculoskeletal: Normal range of motion. General: No tenderness. Comments: No soft tissue injuries   Lymphadenopathy:      Cervical: No cervical adenopathy. Skin:     General: Skin is warm and dry. Findings: No erythema or rash. Comments: Extensive tattooing  Several areas of scattered track marks, without signs of thrombosis or erythema   Neurological:      Mental Status: He is alert and oriented to person, place, and time. Cranial Nerves: No cranial nerve deficit. Coordination: Coordination normal.      Deep Tendon Reflexes: Reflexes are normal and symmetric. Reflexes normal.   Psychiatric:         Behavior: Behavior normal.         Thought Content: Thought content normal.         Judgment: Judgment normal.         Diagnostic Study Results     Labs -     Recent Results (from the past 12 hour(s))   EKG, 12 LEAD, INITIAL    Collection Time: 01/15/20  8:44 PM   Result Value Ref Range    Ventricular Rate 77 BPM    Atrial Rate 77 BPM    P-R Interval 152 ms    QRS Duration 88 ms    Q-T Interval 424 ms    QTC Calculation (Bezet) 479 ms    Calculated P Axis 71 degrees    Calculated R Axis 74 degrees    Calculated T Axis 68 degrees    Diagnosis       Normal sinus rhythm  Normal ECG  Confirmed by Jourdan Omalley MD, [facility] ([id number]) on 1/15/2020 9:24:55 PM     CBC WITH AUTOMATED DIFF    Collection Time: 01/15/20  8:52 PM   Result Value Ref Range    WBC 9.2 4.6 - 13.2 K/uL    RBC 4.99 4.70 - 5.50 M/uL    HGB 15.2 13.0 - 16.0 g/dL    HCT 47.2 36.0 - 48.0 %    MCV 94.6 74.0 - 97.0 FL    MCH 30.5 24.0 - 34.0 PG    MCHC 32.2 31.0 - 37.0 g/dL    RDW 12.2 11.6 - 14.5 %    PLATELET 082 153 - 367 K/uL    MPV 9.9 9.2 - 11.8 FL    NEUTROPHILS 69 40 - 73 %    LYMPHOCYTES 26 21 - 52 %    MONOCYTES 5 3 - 10 %    EOSINOPHILS 0 0 - 5 %    BASOPHILS 0 0 - 2 %    ABS. NEUTROPHILS 6.3 1.8 - 8.0 K/UL    ABS. LYMPHOCYTES 2.4 0.9 - 3.6 K/UL    ABS. MONOCYTES 0.5 0.05 - 1.2 K/UL    ABS. EOSINOPHILS 0.0 0.0 - 0.4 K/UL    ABS.  BASOPHILS 0.0 0.0 - 0.1 K/UL    DF AUTOMATED     METABOLIC PANEL, COMPREHENSIVE    Collection Time: 01/15/20  8:52 PM   Result Value Ref Range    Sodium 140 136 - 145 mmol/L    Potassium 3.9 3.5 - 5.5 mmol/L    Chloride 102 100 - 111 mmol/L    CO2 27 21 - 32 mmol/L    Anion gap 11 3.0 - 18 mmol/L    Glucose 238 (H) 74 - 99 mg/dL    BUN 15 7.0 - 18 MG/DL    Creatinine 1.36 (H) 0.6 - 1.3 MG/DL    BUN/Creatinine ratio 11 (L) 12 - 20      GFR est AA >60 >60 ml/min/1.73m2    GFR est non-AA 58 (L) >60 ml/min/1.73m2    Calcium 9.2 8.5 - 10.1 MG/DL    Bilirubin, total 0.5 0.2 - 1.0 MG/DL    ALT (SGPT) 108 (H) 16 - 61 U/L    AST (SGOT) 59 (H) 10 - 38 U/L    Alk.  phosphatase 63 45 - 117 U/L    Protein, total 9.2 (H) 6.4 - 8.2 g/dL    Albumin 5.0 3.4 - 5.0 g/dL    Globulin 4.2 (H) 2.0 - 4.0 g/dL    A-G Ratio 1.2 0.8 - 1.7     MAGNESIUM    Collection Time: 01/15/20  8:52 PM   Result Value Ref Range    Magnesium 2.5 1.6 - 2.6 mg/dL   ACETAMINOPHEN    Collection Time: 01/15/20  8:52 PM   Result Value Ref Range    Acetaminophen level <2 (L) 10.0 - 95.5 ug/mL   SALICYLATE    Collection Time: 01/15/20  8:52 PM   Result Value Ref Range    Salicylate level 2.0 (L) 2.8 - 20.0 MG/DL   ETHYL ALCOHOL    Collection Time: 01/15/20  8:52 PM   Result Value Ref Range    ALCOHOL(ETHYL),SERUM <3 0 - 3 MG/DL   URINALYSIS W/ RFLX MICROSCOPIC    Collection Time: 01/15/20  9:00 PM   Result Value Ref Range    Color YELLOW      Appearance CLEAR      Specific gravity 1.022 1.005 - 1.030      pH (UA) 5.5 5.0 - 8.0      Protein 100 (A) NEG mg/dL    Glucose 250 (A) NEG mg/dL    Ketone NEGATIVE  NEG mg/dL    Bilirubin NEGATIVE  NEG      Blood NEGATIVE  NEG      Urobilinogen 1.0 0.2 - 1.0 EU/dL    Nitrites NEGATIVE  NEG      Leukocyte Esterase NEGATIVE  NEG     DRUG SCREEN, URINE    Collection Time: 01/15/20  9:00 PM   Result Value Ref Range    BENZODIAZEPINES NEGATIVE  NEG      BARBITURATES NEGATIVE  NEG      THC (TH-CANNABINOL) NEGATIVE  NEG      OPIATES POSITIVE (A) NEG      PCP(PHENCYCLIDINE) NEGATIVE  NEG      COCAINE POSITIVE (A) NEG      AMPHETAMINES POSITIVE (A) NEG      METHADONE NEGATIVE  NEG      HDSCOM (NOTE)    URINE MICROSCOPIC ONLY    Collection Time: 01/15/20  9:00 PM   Result Value Ref Range    WBC 0 to 1 0 - 5 /hpf    RBC 0 to 3 0 - 5 /hpf    Epithelial cells FEW 0 - 5 /lpf    Bacteria NONE SEEN NEG /hpf    Mucus 1+ (A) NEG /lpf    Hyaline cast 0 to 3 0 - 2 /lpf       Radiologic Studies - No orders to display       Medical Decision Making   I am the first provider for this patient. I reviewed the vital signs, available nursing notes, past medical history, past surgical history, family history and social history. Vital Signs-Reviewed the patient's vital signs. Pulse Oximetry Analysis - 98% on RA     Cardiac Monitor:  Rate: 77 bpm  Rhythm: NSR    EKG interpretation: (Preliminary)  NSR at 77 bpm. Nml ST segments. No arrhythmia. QTc of 479. EKG read by Jeanie. Awilda John MD at 2044     Records Reviewed: Nursing Notes and Old Medical Records    Provider Notes (Medical Decision Making): Severity of drug overdose is primary concern, given need for CPR by paramedics. He will require some period of monitoring, to watch for recurrent sedation after Narcan. He will require a full psych work up from blood work and urine. Cardiac monitoring including enzymes and re-assessment after several hours, as well as monitoring for cardiac arrhythmia and QTc. PROCEDURES:  Procedures    MEDICATIONS GIVEN IN THE ED:  Medications - No data to display     ED COURSE:   8:37 PM   Initial assessment performed. PROGRESS NOTE:  11:27 PM  Pt and/or family have been updated on their results. Pt and/or pt's family are aware of the plan of care and are in agreement. Written by Hali Gibbons ED Scribe, as dictated by Nelson John MD.      Diagnosis and Disposition       DISCHARGE NOTE:  11:31 PM  The patient is ready for discharge. The patient's signs, symptoms, diagnosis, and discharge instructions have been discussed and the patient and/or family has conveyed their understanding. The patient and/or family is to follow up as recommended or return to the ER should their symptoms worsen. Plan has been discussed and the patient and/or family is in agreement. Written by Hali Gibbons ED Scribe, as dictated by Nelson John MD.     CLINICAL IMPRESSION:  1.  Drug overdose, accidental or unintentional, initial encounter    2. Polysubstance abuse ([address]        PLAN:  1. D/C Home  2. Current Discharge Medication List        3. Follow-up Information     Follow up With Specialties Details Why Contact Info    [facility]  Schedule an appointment as soon as possible for a visit for follow up [address] [facility], [facility] [address] [id number] [facility]  Schedule an appointment as soon as possible for a visit for follow up [address] [facility] [address]    THE [facility] EMERGENCY DEPT Emergency Medicine  As needed, If symptoms worsen [address]  [address] [facility] [address] [phone]        _______________________________    Attestations: This note is prepared by Madonna Taylor, acting as Scribe for Jeanie. Cristin Gallegos MD.    Jeanie. Cristin Gallegos MD:  The scribe's documentation has been prepared under my direction and personally reviewed by me in its entirety.  I confirm that the note above accurately reflects all work, treatment, procedures, and medical decision making performed by me.  _______________________________

## 2022-07-25 ENCOUNTER — HOSPITAL ENCOUNTER (EMERGENCY)
Age: 42
Discharge: HOME | End: 2022-07-26
Attending: EMERGENCY MEDICINE

## 2022-07-25 DIAGNOSIS — T40.601A OPIATE OVERDOSE, ACCIDENTAL OR UNINTENTIONAL, INITIAL ENCOUNTER (HCC): Primary | ICD-10-CM

## 2022-07-25 LAB
ALBUMIN SERPL-MCNC: 4.4 G/DL (ref 3.4–5)
ALBUMIN/GLOB SERPL: 1.1 {RATIO} (ref 0.8–1.7)
ALP SERPL-CCNC: 61 U/L (ref 45–117)
ALT SERPL-CCNC: 83 U/L (ref 16–61)
ANION GAP SERPL CALC-SCNC: 7 MMOL/L (ref 3–18)
APAP SERPL-MCNC: <2 UG/ML (ref 10–30)
AST SERPL-CCNC: 57 U/L (ref 10–38)
BASOPHILS # BLD: 0.1 K/UL (ref 0–0.1)
BASOPHILS NFR BLD: 0 % (ref 0–2)
BILIRUB SERPL-MCNC: 0.4 MG/DL (ref 0.2–1)
BUN SERPL-MCNC: 14 MG/DL (ref 7–18)
BUN/CREAT SERPL: 11 (ref 12–20)
CALCIUM SERPL-MCNC: 9.3 MG/DL (ref 8.5–10.1)
CHLORIDE SERPL-SCNC: 106 MMOL/L (ref 100–111)
CO2 SERPL-SCNC: 28 MMOL/L (ref 21–32)
CREAT SERPL-MCNC: 1.29 MG/DL (ref 0.6–1.3)
DIFFERENTIAL METHOD BLD: ABNORMAL
EOSINOPHIL # BLD: 0 K/UL (ref 0–0.4)
EOSINOPHIL NFR BLD: 0 % (ref 0–5)
ERYTHROCYTE [DISTWIDTH] IN BLOOD BY AUTOMATED COUNT: 12.6 % (ref 11.6–14.5)
ETHANOL SERPL-MCNC: <3 MG/DL (ref 0–3)
GLOBULIN SER CALC-MCNC: 4 G/DL (ref 2–4)
GLUCOSE SERPL-MCNC: 97 MG/DL (ref 74–99)
HCT VFR BLD AUTO: 40.9 % (ref 36–48)
HGB BLD-MCNC: 13.5 G/DL (ref 13–16)
IMM GRANULOCYTES # BLD AUTO: 0.1 K/UL (ref 0–0.04)
IMM GRANULOCYTES NFR BLD AUTO: 1 % (ref 0–0.5)
LIPASE SERPL-CCNC: 173 U/L (ref 73–393)
LYMPHOCYTES # BLD: 1 K/UL (ref 0.9–3.6)
LYMPHOCYTES NFR BLD: 5 % (ref 21–52)
MCH RBC QN AUTO: 31.2 PG (ref 24–34)
MCHC RBC AUTO-ENTMCNC: 33 G/DL (ref 31–37)
MCV RBC AUTO: 94.5 FL (ref 78–100)
MONOCYTES # BLD: 1.2 K/UL (ref 0.05–1.2)
MONOCYTES NFR BLD: 7 % (ref 3–10)
NEUTS SEG # BLD: 15.5 K/UL (ref 1.8–8)
NEUTS SEG NFR BLD: 87 % (ref 40–73)
NRBC # BLD: 0 K/UL (ref 0–0.01)
NRBC BLD-RTO: 0 PER 100 WBC
PLATELET # BLD AUTO: 336 K/UL (ref 135–420)
PMV BLD AUTO: 9.2 FL (ref 9.2–11.8)
POTASSIUM SERPL-SCNC: 3.8 MMOL/L (ref 3.5–5.5)
PROT SERPL-MCNC: 8.4 G/DL (ref 6.4–8.2)
RBC # BLD AUTO: 4.33 M/UL (ref 4.35–5.65)
SALICYLATES SERPL-MCNC: 2.4 MG/DL (ref 2.8–20)
SODIUM SERPL-SCNC: 141 MMOL/L (ref 136–145)
WBC # BLD AUTO: 17.8 K/UL (ref 4.6–13.2)

## 2022-07-25 PROCEDURE — 83690 ASSAY OF LIPASE: CPT

## 2022-07-25 PROCEDURE — 82077 ASSAY SPEC XCP UR&BREATH IA: CPT

## 2022-07-25 PROCEDURE — 80053 COMPREHEN METABOLIC PANEL: CPT

## 2022-07-25 PROCEDURE — 85025 COMPLETE CBC W/AUTO DIFF WBC: CPT

## 2022-07-25 PROCEDURE — 80179 DRUG ASSAY SALICYLATE: CPT

## 2022-07-25 PROCEDURE — 93005 ELECTROCARDIOGRAM TRACING: CPT

## 2022-07-25 PROCEDURE — 99285 EMERGENCY DEPT VISIT HI MDM: CPT

## 2022-07-25 PROCEDURE — 80143 DRUG ASSAY ACETAMINOPHEN: CPT

## 2022-07-26 VITALS
TEMPERATURE: 98.4 F | DIASTOLIC BLOOD PRESSURE: 50 MMHG | SYSTOLIC BLOOD PRESSURE: 106 MMHG | HEART RATE: 71 BPM | RESPIRATION RATE: 15 BRPM | BODY MASS INDEX: 27.47 KG/M2 | HEIGHT: 67 IN | WEIGHT: 175 LBS | OXYGEN SATURATION: 97 %

## 2022-07-26 NOTE — ED TRIAGE NOTES
AMS    Per EMS< unconscious/unresponsive on [employer] arrival, given 4mg narcan IN; Pt now A&Ox4. Pt denies known drug use. Drug paraphernalia found on Pt.

## 2022-08-01 LAB
ATRIAL RATE: 85 BPM
CALCULATED P AXIS, ECG09: 69 DEGREES
CALCULATED R AXIS, ECG10: 64 DEGREES
CALCULATED T AXIS, ECG11: 66 DEGREES
DIAGNOSIS, 93000: NORMAL
P-R INTERVAL, ECG05: 152 MS
Q-T INTERVAL, ECG07: 396 MS
QRS DURATION, ECG06: 90 MS
QTC CALCULATION (BEZET), ECG08: 471 MS
VENTRICULAR RATE, ECG03: 85 BPM

## 2022-09-30 ENCOUNTER — HOSPITAL ENCOUNTER (EMERGENCY)
Age: 42
Discharge: HOME | End: 2022-09-30
Attending: EMERGENCY MEDICINE

## 2022-09-30 VITALS
TEMPERATURE: 97.8 F | WEIGHT: 175 LBS | HEART RATE: 104 BPM | DIASTOLIC BLOOD PRESSURE: 84 MMHG | BODY MASS INDEX: 27.41 KG/M2 | RESPIRATION RATE: 18 BRPM | SYSTOLIC BLOOD PRESSURE: 141 MMHG | OXYGEN SATURATION: 100 %

## 2022-09-30 DIAGNOSIS — L03.811 ABSCESS OR CELLULITIS OF SCALP: Primary | ICD-10-CM

## 2022-09-30 PROCEDURE — 74011000250 HC RX REV CODE- 250: Performed by: PHYSICIAN ASSISTANT

## 2022-09-30 PROCEDURE — 96372 THER/PROPH/DIAG INJ SC/IM: CPT

## 2022-09-30 PROCEDURE — 87205 SMEAR GRAM STAIN: CPT

## 2022-09-30 PROCEDURE — 74011250636 HC RX REV CODE- 250/636: Performed by: PHYSICIAN ASSISTANT

## 2022-09-30 PROCEDURE — 74011250637 HC RX REV CODE- 250/637: Performed by: PHYSICIAN ASSISTANT

## 2022-09-30 PROCEDURE — 75810000289 HC I&D ABSCESS SIMP/COMP/MULT

## 2022-09-30 PROCEDURE — 87186 SC STD MICRODIL/AGAR DIL: CPT

## 2022-09-30 PROCEDURE — 99284 EMERGENCY DEPT VISIT MOD MDM: CPT

## 2022-09-30 PROCEDURE — 87077 CULTURE AEROBIC IDENTIFY: CPT

## 2022-09-30 RX ORDER — LIDOCAINE HYDROCHLORIDE AND EPINEPHRINE 10; 10 MG/ML; UG/ML
20 INJECTION, SOLUTION INFILTRATION; PERINEURAL ONCE
Status: COMPLETED | OUTPATIENT
Start: 2022-09-30 | End: 2022-09-30

## 2022-09-30 RX ORDER — CEPHALEXIN 500 MG/1
500 CAPSULE ORAL 4 TIMES DAILY
Qty: 40 CAPSULE | Refills: 0 | Status: SHIPPED | OUTPATIENT
Start: 2022-09-30 | End: 2022-10-10

## 2022-09-30 RX ORDER — DOXYCYCLINE 100 MG/1
100 CAPSULE ORAL
Status: COMPLETED | OUTPATIENT
Start: 2022-09-30 | End: 2022-09-30

## 2022-09-30 RX ORDER — DOXYCYCLINE HYCLATE 100 MG
100 TABLET ORAL 2 TIMES DAILY
Qty: 20 TABLET | Refills: 0 | Status: SHIPPED | OUTPATIENT
Start: 2022-09-30 | End: 2022-10-10

## 2022-09-30 RX ADMIN — DOXYCYCLINE 100 MG: 100 CAPSULE ORAL at 21:38

## 2022-09-30 RX ADMIN — LIDOCAINE HYDROCHLORIDE 1 G: 10 INJECTION, SOLUTION EPIDURAL; INFILTRATION; INTRACAUDAL; PERINEURAL at 21:33

## 2022-09-30 RX ADMIN — LIDOCAINE HYDROCHLORIDE,EPINEPHRINE BITARTRATE 200 MG: 10; .01 INJECTION, SOLUTION INFILTRATION; PERINEURAL at 21:16

## 2022-10-01 NOTE — DISCHARGE INSTRUCTIONS
Remove packing in 48 hours. Soak in warm tap water three times a day for 10 minutes until healed.    Finish antibiotics

## 2022-10-01 NOTE — ED PROVIDER NOTES
EMERGENCY DEPARTMENT HISTORY AND PHYSICAL EXAM    Date: 9/30/2022  Patient Name: Peterson Patel    History of Presenting Illness     Chief Complaint   Patient presents with    Wound Check         History Provided By: Patient    Chief Complaint: abscess    HPI(Context):   9:43 PM  Peterson Patel is a 39 y.o. male with hx of opiate abuse who presents to the emergency department C/O scalp abscess. Associated sxs include tenderness, swelling, and erythema to occipital aspect of scalp. Sxs x 2-3 days. No drainage. Area is TTP. No at home tx. Pt has hx of opiate abuse. He denies IVDU. Pt denies fever, chills, nausea, vomiting, wound drianage, and any other sxs or complaints. PCP: None    Current Outpatient Medications   Medication Sig Dispense Refill    doxycycline (VIBRA-TABS) 100 mg tablet Take 1 Tablet by mouth two (2) times a day for 10 days. Indications: an infection of the skin and the tissue below the skin 20 Tablet 0    cephALEXin (Keflex) 500 mg capsule Take 1 Capsule by mouth four (4) times daily for 10 days. Indications: an infection of the skin and the tissue below the skin 40 Capsule 0    dextroamphetamine-amphetamine (ADDERALL) 10 mg tablet Take 10 mg by mouth. Past History     Past Medical History:  Past Medical History:   Diagnosis Date    Knee problem        Past Surgical History:  Past Surgical History:   Procedure Laterality Date    HX ORTHOPAEDIC  hand surgery    VT ABDOMEN SURGERY PROC UNLISTED      VT UNLISTED PROCEDURE SMALL INTESTINE         Family History:  History reviewed. No pertinent family history.     Social History:  Social History     Tobacco Use    Smoking status: Every Day     Packs/day: 0.50     Years: 15.00     Pack years: 7.50     Types: Cigarettes    Smokeless tobacco: Never   Substance Use Topics    Alcohol use: No    Drug use: Yes     Types: Heroin, Cocaine       Allergies:  No Known Allergies      Review of Systems   Review of Systems   Constitutional:  Negative for chills and fever. Gastrointestinal:  Negative for nausea and vomiting. Skin:  Positive for color change and wound. Allergic/Immunologic: Negative for immunocompromised state. All other systems reviewed and are negative. Physical Exam     Vitals:    09/30/22 2103   BP: (!) 141/84   Pulse: (!) 104   Resp: 18   Temp: 97.8 °F (36.6 °C)   SpO2: 100%   Weight: 79.4 kg (175 lb)     Physical Exam  Vitals and nursing note reviewed. Constitutional:       General: He is not in acute distress. Appearance: He is well-developed. He is not diaphoretic. Comments: Disheveled  male in NAD. Alert. Appears comfortable. HENT:      Head: Normocephalic and atraumatic. Comments: 3 cm x 2 cm area of tenderness, swelling, and erythema to occipital aspect of scalp. No drainage. Indurated. Fluctuant. No drainage     Right Ear: External ear normal.      Left Ear: External ear normal.      Nose: Nose normal.   Eyes:      General: No scleral icterus. Right eye: No discharge. Left eye: No discharge. Conjunctiva/sclera: Conjunctivae normal.   Cardiovascular:      Rate and Rhythm: Normal rate and regular rhythm. Pulmonary:      Effort: Pulmonary effort is normal. No tachypnea or accessory muscle usage. Breath sounds: No decreased breath sounds. Musculoskeletal:         General: Normal range of motion. Cervical back: Normal range of motion. Lymphadenopathy:      Head:      Right side of head: No preauricular, posterior auricular or occipital adenopathy. Left side of head: No preauricular, posterior auricular or occipital adenopathy. Skin:     General: Skin is warm and dry. Neurological:      Mental Status: He is alert and oriented to person, place, and time. Psychiatric:         Judgment: Judgment normal.           Diagnostic Study Results     Labs -   No results found for this or any previous visit (from the past 12 hour(s)).     No orders to display     CT Results  (Last 48 hours)      None          CXR Results  (Last 48 hours)      None            Medications given in the ED-  Medications   doxycycline (MONODOX) capsule 100 mg (100 mg Oral Given 9/30/22 2138)   lidocaine-EPINEPHrine (XYLOCAINE) 1 %-1:100,000 injection 200 mg (200 mg IntraDERMal Given by Provider 9/30/22 2116)   cefTRIAXone (ROCEPHIN) 1 g in lidocaine (PF) (XYLOCAINE) 10 mg/mL (1 %) IM injection (1 g IntraMUSCular Given 9/30/22 2133)         Medical Decision Making   I am the first provider for this patient. I reviewed the vital signs, available nursing notes, past medical history, past surgical history, family history and social history. Vital Signs-Reviewed the patient's vital signs. Pulse Oximetry Analysis - 100% on RA. NORMAL    Records Reviewed: Nursing Notes and Old Medical Records    Provider Notes (Medical Decision Making): abscess to scalp. Will I&D and culture. No evidence of deep or systemic process. Procedures:  I&D Abcess Complex    Date/Time: 9/30/2022 9:48 PM  Performed by: Ceci Palomino PA-C  Authorized by: Ceci Palomino PA-C     Consent:     Consent obtained:  Verbal    Consent given by:  Patient    Risks discussed:  Pain  Universal protocol:     Patient identity confirmed:  Verbally with patient  Location:     Type:  Abscess    Size:  2cm x 2 cm    Location:  Head    Head location:  Scalp  Pre-procedure details:     Skin preparation:  Povidone-iodine  Sedation:     Sedation type:  None  Anesthesia:     Anesthesia method:  Local infiltration    Local anesthetic:  Lidocaine 1% WITH epi  Procedure type:     Complexity:  Complex  Procedure details:     Incision types:  Single straight    Incision depth:  Dermal    Wound management:  Probed and deloculated, irrigated with saline and extensive cleaning    Drainage:  Serosanguinous and purulent    Drainage amount:   Moderate    Wound treatment:  Wound left open    Packing materials:  1/4 in iodoform gauze    ED Course: 9:43 PM Initial assessment performed. The patients presenting problems have been discussed, and they are in agreement with the care plan formulated and outlined with them. I have encouraged them to ask questions as they arise throughout their visit. Diagnosis and Disposition       Successful I&D. Packed. Removal in 48 hours. SITZ baths TID x 10 minutes until healed after. ABX started in ED. Finish doxy and keflex. Wound culture in process. No deep or systemic infectious process. Reasons to RTED discussed with pt. All questions answered. Pt feels comfortable going home at this time. Pt expressed understanding and he agrees with plan. 1. Abscess or cellulitis of scalp        PLAN:  1. D/C Home  2. Current Discharge Medication List        START taking these medications    Details   doxycycline (VIBRA-TABS) 100 mg tablet Take 1 Tablet by mouth two (2) times a day for 10 days. Indications: an infection of the skin and the tissue below the skin  Qty: 20 Tablet, Refills: 0  Start date: 9/30/2022, End date: 10/10/2022      cephALEXin (Keflex) 500 mg capsule Take 1 Capsule by mouth four (4) times daily for 10 days. Indications: an infection of the skin and the tissue below the skin  Qty: 40 Capsule, Refills: 0  Start date: 9/30/2022, End date: 10/10/2022           3. Follow-up Information       Follow up With Specialties Details Why [address]    [facility]  As needed, If symptoms worsen [address] [id number]  [phone]          _______________________________    Attestations: This note is prepared by Rayna Suresh PA-C.  _______________________________        Please note that this dictation was completed with [employer], the [employer] voice recognition software. Quite often unanticipated grammatical, syntax, homophones, and other interpretive errors are inadvertently transcribed by the computer software. Please disregard these errors.   Please excuse any errors that have escaped final proofreading.

## 2022-10-03 LAB
BACTERIA SPEC CULT: ABNORMAL
GRAM STN SPEC: ABNORMAL
GRAM STN SPEC: ABNORMAL
SERVICE CMNT-IMP: ABNORMAL